# Patient Record
Sex: FEMALE | Race: ASIAN | URBAN - METROPOLITAN AREA
[De-identification: names, ages, dates, MRNs, and addresses within clinical notes are randomized per-mention and may not be internally consistent; named-entity substitution may affect disease eponyms.]

---

## 2022-12-22 ENCOUNTER — OFFICE VISIT (OUTPATIENT)
Facility: LOCATION | Age: 47
End: 2022-12-22
Payer: COMMERCIAL

## 2022-12-22 DIAGNOSIS — H43.811 VITREOUS DEGENERATION, RIGHT EYE: ICD-10-CM

## 2022-12-22 DIAGNOSIS — H52.13 MYOPIA, BILATERAL: Primary | ICD-10-CM

## 2022-12-22 PROCEDURE — 92004 COMPRE OPH EXAM NEW PT 1/>: CPT

## 2022-12-22 PROCEDURE — 92310 CONTACT LENS FITTING OU: CPT

## 2022-12-22 ASSESSMENT — INTRAOCULAR PRESSURE
OD: 16
OS: 16

## 2022-12-22 ASSESSMENT — VISUAL ACUITY
OD: 20/20
OS: 20/20

## 2022-12-22 NOTE — IMPRESSION/PLAN
Impression: Vitreous degeneration, right eye: H43.811. Plan: Pt educated on condition. No retinal holes/breaks/tears/detachments seen today. Pt educated on signs/symptoms of a retinal detachment, including new flashes of light, changes in floaters, and/or a curtain in vision. If s/s noted, present to clinic or emergency room STAT. Pt expressed understanding. 

Due to pt being very myopic, recommended pt return for [facility]

## 2022-12-22 NOTE — IMPRESSION/PLAN
Impression: Myopia, bilateral: H52.13. Plan: Pt educated on minimal change to SRx today. Finalized SRx released. RTC 1-year for CEE; or sooner prn. Ordered new CL trials:
Biofinity Toric XR; BC 8.7, Kait 14.5 OD: -9.50-0.75x180 OS: -11.50-0.75x165 RTC when trials arrive Pt asked to bring in old CLs box

## 2023-01-12 ENCOUNTER — OFFICE VISIT (OUTPATIENT)
Facility: LOCATION | Age: 48
End: 2023-01-12
Payer: COMMERCIAL

## 2023-01-12 DIAGNOSIS — H43.811 VITREOUS DEGENERATION, RIGHT EYE: Primary | ICD-10-CM

## 2023-01-12 PROCEDURE — 99213 OFFICE O/P EST LOW 20 MIN: CPT

## 2023-01-12 ASSESSMENT — INTRAOCULAR PRESSURE
OS: 16
OD: 18

## 2023-01-12 NOTE — IMPRESSION/PLAN
Impression: Vitreous degeneration, right eye: H43.811. Plan: Pt educated on condition. No retinal holes/breaks/tears/detachments seen today. Pt educated on signs/symptoms of a retinal detachment, including new flashes of light, changes in floaters, and/or a curtain in vision. If s/s noted, present to clinic or emergency room STAT. Pt expressed understanding. Recommend annual DFE due to being very myopic.

## 2023-01-20 ENCOUNTER — OFFICE VISIT (OUTPATIENT)
Facility: LOCATION | Age: 48
End: 2023-01-20

## 2023-01-20 DIAGNOSIS — H52.13 MYOPIA, BILATERAL: Primary | ICD-10-CM

## 2023-01-20 PROCEDURE — 92310 CONTACT LENS FITTING OU: CPT

## 2023-01-20 ASSESSMENT — VISUAL ACUITY
OD: 20/20
OS: 20/25

## 2023-01-20 NOTE — IMPRESSION/PLAN
Impression: Myopia, bilateral: H52.13. Plan: Finalized CLRx released today. Discussed with patient the importance of good contact lens hygiene, including not sleeping, showering, or swimming in the lenses. If a red eye or pain is noted, instructed patient to notify the clinic and discontinue CL wear. Pt expressed understanding. RTC 1-year for CEE with CL fit; or sooner prn. Made adjustment to CL trials in office. Finalized new CLs. Ordered new CL for pt to try before ordering new lenses. Pt expressed understanding.

## 2023-03-03 ENCOUNTER — OFFICE VISIT (OUTPATIENT)
Facility: LOCATION | Age: 48
End: 2023-03-03

## 2023-03-03 DIAGNOSIS — H52.13 MYOPIA, BILATERAL: Primary | ICD-10-CM

## 2023-03-03 PROCEDURE — 92310 CONTACT LENS FITTING OU: CPT

## 2023-03-03 ASSESSMENT — INTRAOCULAR PRESSURE
OS: 16
OD: 16

## 2023-03-03 NOTE — IMPRESSION/PLAN
Impression: Myopia, bilateral: H52.13. Plan: Had issues at near with new Rx. Will add +0.50D OU to help with near vision Ordered new CL trials:
Biofinity Toric XR; BC 8.7, Kait 14.5 OD: -9.00-0.75x180 OS: -11.00-0.75x165 / -10.50-0.75x165 (near- will try monovision) RTC when trials arrive Will have pt to reach out to ras to give her the old CL prescription

## 2024-04-05 ENCOUNTER — OFFICE VISIT (OUTPATIENT)
Facility: LOCATION | Age: 49
End: 2024-04-05
Payer: COMMERCIAL

## 2024-04-05 DIAGNOSIS — H52.13 MYOPIA, BILATERAL: Primary | ICD-10-CM

## 2024-04-05 PROCEDURE — 92012 INTRM OPH EXAM EST PATIENT: CPT

## 2024-04-05 ASSESSMENT — INTRAOCULAR PRESSURE
OS: 18
OD: 15

## 2025-02-07 ENCOUNTER — OFFICE VISIT (OUTPATIENT)
Facility: LOCATION | Age: 50
End: 2025-02-07
Payer: COMMERCIAL

## 2025-02-07 DIAGNOSIS — H52.13 MYOPIA, BILATERAL: Primary | ICD-10-CM

## 2025-02-07 PROCEDURE — 92012 INTRM OPH EXAM EST PATIENT: CPT

## 2025-02-07 ASSESSMENT — KERATOMETRY
OD: 44.88
OS: 45.00

## 2025-02-07 ASSESSMENT — VISUAL ACUITY
OD: 20/20
OS: 20/20

## 2025-02-07 ASSESSMENT — INTRAOCULAR PRESSURE
OS: 14
OD: 14